# Patient Record
Sex: MALE | Race: WHITE
[De-identification: names, ages, dates, MRNs, and addresses within clinical notes are randomized per-mention and may not be internally consistent; named-entity substitution may affect disease eponyms.]

---

## 2018-02-16 ENCOUNTER — HOSPITAL ENCOUNTER (EMERGENCY)
Dept: HOSPITAL 63 - ER | Age: 38
Discharge: HOME | End: 2018-02-16
Payer: OTHER GOVERNMENT

## 2018-02-16 VITALS — SYSTOLIC BLOOD PRESSURE: 136 MMHG | DIASTOLIC BLOOD PRESSURE: 92 MMHG

## 2018-02-16 VITALS — BODY MASS INDEX: 29.26 KG/M2 | HEIGHT: 74 IN | WEIGHT: 228 LBS

## 2018-02-16 DIAGNOSIS — M10.9: Primary | ICD-10-CM

## 2018-02-16 DIAGNOSIS — M79.672: ICD-10-CM

## 2018-02-16 DIAGNOSIS — M19.90: ICD-10-CM

## 2018-02-16 PROCEDURE — 99283 EMERGENCY DEPT VISIT LOW MDM: CPT

## 2018-02-16 PROCEDURE — 84550 ASSAY OF BLOOD/URIC ACID: CPT

## 2018-02-16 PROCEDURE — 36415 COLL VENOUS BLD VENIPUNCTURE: CPT

## 2018-02-16 NOTE — PHYS DOC
Past History


Past Medical History:  Arthritis


Past Surgical History:  Other


Alcohol Use:  None


Drug Use:  None





Adult General


Chief Complaint


Chief Complaint:  FOOT INJURY PAIN





HPI


HPI





Patient is a 37 year old M who presents with progressively worsening left foot 

pain. He describes redness and swelling without a known injury. He has had 

intermittent episodes similar to this approximately yearly over the past 

several years. He describes severe tenderness to light touch and describes his 

pain as burning. He has no other associated symptoms. He has no other 

exacerbating or alleviating factors.





Review of Systems


Review of Systems





Constitutional: Denies fever or chills []


Eyes: Denies change in visual acuity, redness, or eye pain []


HENT: Denies nasal congestion or sore throat []


Respiratory: Denies cough or shortness of breath []


Cardiovascular: No additional information not addressed in HPI []


GI: Denies abdominal pain, nausea, vomiting, bloody stools or diarrhea []


: Denies dysuria or hematuria []


Musculoskeletal: Denies back pain


Integument: Denies  skin lesions []


Neurologic: Denies headache, focal weakness or sensory changes []


Endocrine: Denies polyuria or polydipsia []





All other systems were reviewed and found to be within normal limits, except as 

documented in this note.





Family History


Family History


Pertinent medical history was reported





Current Medications


Current Medications


Current medications reviewed


Current Medications








 Medications


  (Trade)  Dose


 Ordered  Sig/Toño  Start Time


 Stop Time Status Last Admin


Dose Admin


 


 Ondansetron HCl


  (Zofran Odt)  4 mg  1X  ONCE  2/16/18 13:30


 2/16/18 13:31 DC 2/16/18 13:16


4 MG


 


 Prednisone


  (Prednisone)  50 mg  1X  ONCE  2/16/18 13:30


 2/16/18 13:31 DC 2/16/18 13:42


50 MG











Allergies


Allergies





Allergies








Coded Allergies Type Severity Reaction Last Updated Verified


 


  No Known Drug Allergies    2/16/18 No











Physical Exam


Physical Exam





Constitutional: Well developed, well nourished, no acute distress, non-toxic 

appearance. []


HENT: Normocephalic, atraumatic,


Eyes:  EOMI, conjunctiva normal, no discharge. [] 


Neck: Normal range of motion, no tenderness, supple, no stridor. [] 


Cardiovascular:Heart rate regular rhythm, 


Lungs & Thorax:  Bilateral breath sounds clear to auscultation []


Abdomen: Bowel sounds normal, soft, no tenderness, no masses, no pulsatile 

masses. [] 


Skin: Warm, dry, no erythema, 


Extremities: no cyanosis, no clubbing, ROM intact, moderate swelling of the 

left dorsal lateral foot with erythema and moderate tenderness to light touch


Neurologic: Alert and oriented X 3, normal motor function, normal sensory 

function, no focal deficits noted. []


Psychologic: Affect normal, judgement normal, mood normal. []





Current Patient Data


Vital Signs





 Vital Signs








  Date Time  Temp Pulse Resp B/P (MAP) Pulse Ox O2 Delivery O2 Flow Rate FiO2


 


2/16/18 12:55 98.9 99 16  97 Room Air  








Lab Results





 Laboratory Tests








Test


  2/16/18


13:37


 


Uric Acid


  8.9 mg/dL


(3.5-7.2)  H











EKG


EKG


[]





Radiology/Procedures


Radiology/Procedures


[]





Course & Med Decision Making


Course & Med Decision Making


Pertinent Labs and Imaging studies reviewed. (See chart for details)





Collin was treated with prednisone, as he is on Celebrex. He was also given a 

prescription for allopurinol to start after his symptoms resolve.





Dragon Disclaimer


Dragon Disclaimer


This electronic medical record was generated, in whole or in part, using a 

voice recognition dictation system.





Departure


Departure:


Impression:  


 Primary Impression:  


 Acute gouty arthritis


Disposition:  01 HOME, SELF-CARE


Condition:  STABLE


Patient Instructions:  Gout





Additional Instructions:  


Collin was seen in the emergency department for foot pain. No emergency medical 

condition was found on history or physical exam. He was found have gout with 

uric acid of 8.6.  He was started on prednisone and advised continue until his 

symptoms resolve. He was also given a prescription for allopurinol to start 

after his symptoms resolve. He is given a prescription for Voltaren gel to use 

as needed for pain. He is advised follow-up with his primary care doctor in the 

next 2-3 weeks for repeat labs and further management.


Scripts


Prednisone (PREDNISONE) 50 Mg Tablet


1 TAB PO DAILY for 7 Days, #7 TAB


   Prov: GIORGIO GOMES MD         2/16/18 


Allopurinol (ALLOPURINOL) 100 Mg Tablet


1 TAB PO DAILY, #30 TAB 5 Refills


   Prov: GIORGIO GOMES MD         2/16/18 


Diclofenac Sodium (VOLTAREN) 100 Gm Gel..gram.


1 GM TP QID, #100 GM 2 Refills


   Prov: GIORGIO GOMES MD         2/16/18











GIORGIO GOMES MD Feb 16, 2018 14:26